# Patient Record
Sex: MALE | Race: WHITE | NOT HISPANIC OR LATINO | Employment: PART TIME | URBAN - METROPOLITAN AREA
[De-identification: names, ages, dates, MRNs, and addresses within clinical notes are randomized per-mention and may not be internally consistent; named-entity substitution may affect disease eponyms.]

---

## 2021-08-26 ENCOUNTER — TREATMENT (OUTPATIENT)
Dept: PHYSICAL THERAPY | Facility: CLINIC | Age: 36
End: 2021-08-26

## 2021-08-26 DIAGNOSIS — Z78.9 DIFFICULTY NAVIGATING STAIRS: ICD-10-CM

## 2021-08-26 DIAGNOSIS — M25.561 ACUTE PAIN OF RIGHT KNEE: Primary | ICD-10-CM

## 2021-08-26 DIAGNOSIS — M54.50 ACUTE LEFT-SIDED LOW BACK PAIN WITHOUT SCIATICA: ICD-10-CM

## 2021-08-26 PROCEDURE — 97161 PT EVAL LOW COMPLEX 20 MIN: CPT | Performed by: PHYSICAL THERAPIST

## 2021-08-26 PROCEDURE — 97112 NEUROMUSCULAR REEDUCATION: CPT | Performed by: PHYSICAL THERAPIST

## 2021-08-26 PROCEDURE — 97110 THERAPEUTIC EXERCISES: CPT | Performed by: PHYSICAL THERAPIST

## 2021-08-26 PROCEDURE — 97530 THERAPEUTIC ACTIVITIES: CPT | Performed by: PHYSICAL THERAPIST
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
## 2021-09-21 ENCOUNTER — TREATMENT (OUTPATIENT)
Dept: PHYSICAL THERAPY | Facility: CLINIC | Age: 36
End: 2021-09-21

## 2021-09-21 DIAGNOSIS — M25.561 ACUTE PAIN OF RIGHT KNEE: Primary | ICD-10-CM

## 2021-09-21 DIAGNOSIS — M54.50 ACUTE LEFT-SIDED LOW BACK PAIN WITHOUT SCIATICA: ICD-10-CM

## 2021-09-21 DIAGNOSIS — Z78.9 DIFFICULTY NAVIGATING STAIRS: ICD-10-CM

## 2021-09-21 PROCEDURE — 97110 THERAPEUTIC EXERCISES: CPT | Performed by: PHYSICAL THERAPIST

## 2021-09-21 PROCEDURE — 97112 NEUROMUSCULAR REEDUCATION: CPT | Performed by: PHYSICAL THERAPIST

## 2021-09-21 NOTE — PROGRESS NOTES
Physical Therapy Daily Progress Note    Patient: Erich Casanova   : 1985  Diagnosis/ICD-10 Code:  Acute pain of right knee [M25.561]  Referring practitioner: Tryo Stone MD  Date of Initial Visit: Type: THERAPY  Noted: 2021  Today's Date: 2021  Patient seen for 2 sessions           Subjective Sore in back and outside of knee    Objective   Trial of kinsio taping to head of fib and distal IT BAND   Fibular mobs    THEREX with EDUCATION for home   1- FROG STRETCH  bottoms of feet together  1-5 min  2- FRONT OF HIP  STRETCH   a. Cross leg gentleman style and push knee down  10-20 sec  3- BACK OF HIP STRETCH    a. Pretzel stretch /  reverse pigeon  4- PIGEON POSE  5- ABS    a. Legs up in chair position with BACK FLAT  i. Straighten 1 leg and keep back flat   switch legs  GOAL1 MIN  6- SIDELYING LEG LIFT   10-15x  1-2 sets  7- 1 LEG BALANCE at mirror  8- NINJA WALK  a. Knees straight  b. Knees bent  9- POWER walk       Assessment/Plan    A: significant work needed in single limb stance with noted improved functional walk once muscles are activated   PLAN; progress strengthening and closed chain activities        Timed:    Manual Therapy:    0     mins  03390;  Therapeutic Exercise:    23     mins  93039;     Neuromuscular Monique:    23    mins  42164;    Therapeutic Activity:     0     mins  55184;     Gait Trainin     mins  54944;     Ultrasound:     0     mins  71158;    Electrical Stimulation:    0     mins  17977 ( );  Iontophoresis    0     mins 54485;  Aquatic Therapy    0     mins 99632;  Dry Needling              0     mins    Untimed:  Electrical Stimulation:    0     mins  06145 ( );  Mechanical Traction:    0     mins  00367;     Timed Treatment:   46   mins   Total Treatment:     46   mins  Tiffany Parkinson, PT  Physical Therapist

## 2021-09-28 ENCOUNTER — TREATMENT (OUTPATIENT)
Dept: PHYSICAL THERAPY | Facility: CLINIC | Age: 36
End: 2021-09-28

## 2021-09-28 DIAGNOSIS — M25.561 ACUTE PAIN OF RIGHT KNEE: Primary | ICD-10-CM

## 2021-09-28 DIAGNOSIS — Z78.9 DIFFICULTY NAVIGATING STAIRS: ICD-10-CM

## 2021-09-28 DIAGNOSIS — M54.50 ACUTE LEFT-SIDED LOW BACK PAIN WITHOUT SCIATICA: ICD-10-CM

## 2021-09-28 PROCEDURE — 97110 THERAPEUTIC EXERCISES: CPT | Performed by: PHYSICAL THERAPIST

## 2021-09-28 PROCEDURE — 97035 APP MDLTY 1+ULTRASOUND EA 15: CPT | Performed by: PHYSICAL THERAPIST

## 2021-09-28 NOTE — PROGRESS NOTES
Physical Therapy Daily Progress Note    Patient: Erich Casanova   : 1985  Diagnosis/ICD-10 Code:  Acute pain of right knee [M25.561]  Referring practitioner: Troy Stone MD  Date of Initial Visit: Type: THERAPY  Noted: 2021  Today's Date: 2021  Patient seen for 3 sessions           Subjective Back feels good  Knee still bothers espeically after walking approximately 1 mile.  Pain is on the outside and towards the front but deep inside.  It feels maybe 50% back to normal      Objective   LCL laxity test painful on RIGHT vs LEFT   No significant tenderness to palptaion escept for distal ITBand and fibular head    Single limb stance limited with lack of terminal knee extension      Noted trunk SIDE FLEXION  secondry to poor weight shift thru hips     US 3 Mhz to distal IT band and lateral knee x 8 @ 1.5 w/cm2    SIDE LYING hip abd x 10  With verbal cueing for proper technique     Elliptical x 2 min with verbal cueing for knee joint protection       May do intervals with machines to minimize irritation to knee   Leg press matrix seat 8  100#  Terminal knee extension x 10     Kavya   Hip add x 40# x 10 bilaterally x 10 single  Hip abd  30#  x 10 bilaterally x 10 single      Assessment/Plan    A: may need to go back to MD for reassess if pain is centralizing deep in lateral joint    PLAN progress therex with care for joint protection     Timed:    Manual Therapy:    0     mins  33218;  Therapeutic Exercise:    30     mins  70806;     Neuromuscular Monique:    0    mins  20898;    Therapeutic Activity:     0     mins  18991;     Gait Trainin     mins  86265;     Ultrasound:     10     mins  49022;    Electrical Stimulation:    0     mins  88328 ( );  Iontophoresis    0     mins 63876;  Aquatic Therapy    0     mins 50738;  Dry Needling              0     mins    Untimed:  Electrical Stimulation:    0     mins  77372 (MC );  Mechanical Traction:    0     mins  70493;     Timed  Treatment:   38   mins   Total Treatment:     38   mins  Tiffany Parkinson, PT  Physical Therapist

## 2021-10-12 ENCOUNTER — TREATMENT (OUTPATIENT)
Dept: PHYSICAL THERAPY | Facility: CLINIC | Age: 36
End: 2021-10-12

## 2021-10-12 DIAGNOSIS — Z78.9 DIFFICULTY NAVIGATING STAIRS: ICD-10-CM

## 2021-10-12 DIAGNOSIS — M25.561 ACUTE PAIN OF RIGHT KNEE: Primary | ICD-10-CM

## 2021-10-12 DIAGNOSIS — M54.50 ACUTE LEFT-SIDED LOW BACK PAIN WITHOUT SCIATICA: ICD-10-CM

## 2021-10-12 PROCEDURE — 97110 THERAPEUTIC EXERCISES: CPT | Performed by: PHYSICAL THERAPIST

## 2021-10-12 PROCEDURE — 97112 NEUROMUSCULAR REEDUCATION: CPT | Performed by: PHYSICAL THERAPIST

## 2021-10-12 NOTE — PROGRESS NOTES
"30-Day / 10-Visit Progress Note         Patient: Erich Casanova   : 1985  Diagnosis/ICD-10 Code:  Acute pain of right knee [M25.561]  Referring practitioner: Troy Stone MD  Date of Initial Visit: Type: THERAPY  Noted: 2021  Today's Date: 10/12/2021  Patient seen for 4 sessions      Subjective:     Clinical Progress: improved  Home Program Compliance: Yes  Treatment has included:  therapeutic exercise, therapeutic activity, neuro-muscular retraining , gait training and patient education with home exercise program     Subjective   Better but still varies day to day   Avoiding running and higher level activites  PAIN on lateral knee 4/10 with certain motn stairs 5/10 brief only while doing stiars   Walking 30 min or so then lateral knee will be sore the rest of the day   Single limb stance will exacerbation bilaterally knees but LEFT only 3-4/10      Objective     Leg press x 10    Wall sit without ball 1 min     With ball 1 min    Both with verbal cueing for proper technique   Hip abd 30# x 10 bilaterally x 10 Single        Add  40# x 10 bilaterally x 10 Single    PRE running drills for neuro warm up and plyometrics   Bound x 1 min   Giant steps x 4 laps   \"fairy leaps\" x 2 laps   Jog style to be fluid with verbal cueing     RIGHT   -2-139  LEFT   -2 147  SLR  60 bilaterally   RACHEL 25% stiff at end rnag ewithout symptom     Assessment/Plan   A; progressing well with therex but lateral knee pain remains  Having MRI on Thursday   Plan Goals: STG: to be met by 6 weeks  1- Patient will report pain < 0 /10 with light household activities      ONGOING   2-Patient will increase Lumbar ROM  to  WNL without compensation or exacerbation        MET   Lumbar AROM WNL all planes  3-Patient will be independent with HEP without compensation or exacerbation       MET   LTG: to be met by 12 weeks  1-Pt will report pain < 0 /10 with recreational activities   2-Pt will increase RACHEL RIGHT   to  WNL   RIGHT= LEFT " without compensation or exacerbation   3-Patient will improve single limb stance to WNL without compensation or exacerbation for static and dynamic activities such as walking and navigating stairs without exacerbation   4-Pt will be independent with symptom management and comprehsive HEP and at gym    PLAN await results of MRI     Recommendations: Continue as planned  Timeframe: 2 months  Prognosis to achieve goals: good    PT Signature: Tiffany Parkinson, PT      Based upon review of the patient's progress and continued therapy plan, it is my medical opinion that Erich Casanova should continue physical therapy treatment at [facility]  [address] [place] [address] [place]   [place] [address]  [phone].    Signature: __________________________________  Troy Stone MD    Timed:  Manual Therapy:    0     mins  51083;  Therapeutic Exercise:    23     mins  30438;     Neuromuscular Monique:    23    mins  82777;    Therapeutic Activity:     0     mins  81007;     Gait Trainin     mins  61770;     Ultrasound:     0     mins  10965;    Electrical Stimulation:    0     mins  97184 ( );  Iontophoresis    0     mins 89143;  Dry Needling              0     mins    Untimed:  Electrical Stimulation:    0     mins  11376 ( );  Mechanical Traction:    0     mins  30059;     Timed Treatment:   46   mins   Total Treatment:     46   mins

## 2021-10-19 ENCOUNTER — TREATMENT (OUTPATIENT)
Dept: PHYSICAL THERAPY | Facility: CLINIC | Age: 36
End: 2021-10-19

## 2021-10-19 DIAGNOSIS — Z78.9 DIFFICULTY NAVIGATING STAIRS: ICD-10-CM

## 2021-10-19 DIAGNOSIS — M25.561 ACUTE PAIN OF RIGHT KNEE: Primary | ICD-10-CM

## 2021-10-19 DIAGNOSIS — M54.50 ACUTE LEFT-SIDED LOW BACK PAIN WITHOUT SCIATICA: ICD-10-CM

## 2021-10-19 PROCEDURE — 97112 NEUROMUSCULAR REEDUCATION: CPT | Performed by: PHYSICAL THERAPIST

## 2021-10-19 PROCEDURE — 97110 THERAPEUTIC EXERCISES: CPT | Performed by: PHYSICAL THERAPIST

## 2021-10-19 NOTE — PROGRESS NOTES
Physical Therapy Daily Progress Note    Patient: Erich Casanova   : 1985  Diagnosis/ICD-10 Code:  Acute pain of right knee [M25.561]  Referring practitioner: Troy Stone MD  Date of Initial Visit: Type: THERAPY  Noted: 2021  Today's Date: 10/19/2021  Patient seen for 5 sessions           Subjective I received the results of my MRI   All was good no surgery needed staring NSAIDS for edema    Objective   EDUCATION for interarticular edema and how can affect ADLs      Use of MH  ICE and increasing cardio to point of sweating    Hamstring stretch 60 sec x 4 with verbal cueing for proper technique   Calf stretch  30 sec for gastroc and soleus x 2    Standing at wall;  Off edge of step; with pro stretch     Single limb stance balance with work with verbal cueing for core stabilization      EDUCATION and recommendation to try shoe inserts to support arch to assist and stability of closed chain activities     Elliptical with verbal cueing for proper technique for joint protection of knee       Assessment/Plan    A: progressing well with ROM and static strengthening  MRI shows edema in the joing which new meds should help  PLAN reassess after new NSAIDS and if better start pre running drills and single limb stance work        Timed:    Manual Therapy:    0     mins  84495;  Therapeutic Exercise:    23     mins  47914;     Neuromuscular Monique:    15    mins  50390;    Therapeutic Activity:     0     mins  58038;     Gait Trainin     mins  16580;     Ultrasound:     0     mins  97185;    Electrical Stimulation:    0     mins  09390 ( );  Iontophoresis    0     mins 83914;  Aquatic Therapy    0     mins 94979;  Dry Needling              0     mins    Untimed:  Electrical Stimulation:    0     mins  79499 ( );  Mechanical Traction:    0     mins  90197;     Timed Treatment:   46   mins   Total Treatment:     46   mins  Tiffany Parkinson, JESSI  Physical Therapist

## 2021-10-26 ENCOUNTER — TREATMENT (OUTPATIENT)
Dept: PHYSICAL THERAPY | Facility: CLINIC | Age: 36
End: 2021-10-26

## 2021-10-26 DIAGNOSIS — M25.561 ACUTE PAIN OF RIGHT KNEE: Primary | ICD-10-CM

## 2021-10-26 DIAGNOSIS — M54.50 ACUTE LEFT-SIDED LOW BACK PAIN WITHOUT SCIATICA: ICD-10-CM

## 2021-10-26 DIAGNOSIS — Z78.9 DIFFICULTY NAVIGATING STAIRS: ICD-10-CM

## 2021-10-26 PROCEDURE — 97112 NEUROMUSCULAR REEDUCATION: CPT | Performed by: PHYSICAL THERAPIST

## 2021-10-26 PROCEDURE — 97110 THERAPEUTIC EXERCISES: CPT | Performed by: PHYSICAL THERAPIST

## 2021-10-26 NOTE — PROGRESS NOTES
"Physical Therapy Daily Progress Note    Patient: Erich Casanova   : 1985  Diagnosis/ICD-10 Code:  Acute pain of right knee [M25.561]  Referring practitioner: Troy Stone MD  Date of Initial Visit: Type: THERAPY  Noted: 2021  Today's Date: 10/26/2021  Patient seen for 6 sessions           Subjective feeling about the same.  Some pain with stiffness affecting ability to feel like I can not get up and run whenever I wanted      Objective   Elliptical with EDUCATION for warm up   Interval training and goal to break a sweat to pump inflammation out of knee   VC for weight on heels and to progress in painfree range    Single limb stance work    On step \"huy\"   At mirror with bands for \"tippy bird\"   All with verbal cueing for proper technique and recruitment of slings    Hamstring stretch with rope  30 sec x 2  Piriformis stretch 30 sec x 2     Ice x 10       Assessment/Plan  :A improved work with closed chain activities   PLAN reassess incerased vigor with intervals          Timed:    Manual Therapy:    0     mins  83640;  Therapeutic Exercise:    15     mins  96845;     Neuromuscular Monique:    30    mins  52715;    Therapeutic Activity:     0     mins  28830;     Gait Trainin     mins  02941;     Ultrasound:     0     mins  27698;    Electrical Stimulation:    0     mins  93770 ( );  Iontophoresis    0     mins 34540;  Aquatic Therapy    0     mins 43017;  Dry Needling              0     mins    Untimed:  Electrical Stimulation:    0     mins  84940 ( );  Mechanical Traction:    0     mins  61035;     Timed Treatment:   45   mins   Total Treatment:     45   mins  Tiffany Parkinson PT  Physical Therapist  "
Astrid Miller (male)

## 2021-11-02 ENCOUNTER — TREATMENT (OUTPATIENT)
Dept: PHYSICAL THERAPY | Facility: CLINIC | Age: 36
End: 2021-11-02

## 2021-11-02 DIAGNOSIS — M54.50 ACUTE LEFT-SIDED LOW BACK PAIN WITHOUT SCIATICA: ICD-10-CM

## 2021-11-02 DIAGNOSIS — M25.561 ACUTE PAIN OF RIGHT KNEE: Primary | ICD-10-CM

## 2021-11-02 DIAGNOSIS — Z78.9 DIFFICULTY NAVIGATING STAIRS: ICD-10-CM

## 2021-11-02 PROCEDURE — 97110 THERAPEUTIC EXERCISES: CPT | Performed by: PHYSICAL THERAPIST

## 2021-11-02 PROCEDURE — 97112 NEUROMUSCULAR REEDUCATION: CPT | Performed by: PHYSICAL THERAPIST

## 2021-11-02 NOTE — PROGRESS NOTES
"Physical Therapy Daily Progress Note    Patient: Erich Casanova   : 1985  Diagnosis/ICD-10 Code:  Acute pain of right knee [M25.561]  Referring practitioner: Troy Stone MD  Date of Initial Visit: Type: THERAPY  Noted: 2021  Today's Date: 2021  Patient seen for 7 sessions           Subjective meloxicam 9on it now for about 2 weeks)  Is starting to kick in and pretty much no pain except after working out    staris are ok     Piriformis stretch and prigeon still causes soreness on the outside of the knee    Was able to jog approximately 1/2 mile previously       Objective   Elliptical x 5 min with ed for proper technique to be joint friendly for knee    Stretch   Piriformis  30 sec x  bilaterally   Hamstring  60 sec c 2 bilaterally     Pre running drills     Skipping   Hamstring kicks  High knees   Cross over front   Cross over back   Verbal cueing for all and ed as to joint protection for knee  Running   Verbal cueing to minimize vertical \"bounce\"     Increased push off thru foot   [place]    Single limb stance mechanics    Verbal cueing for stance leg mechanics     All with slight complaints of soreness laterally       Assessment/Plan  A' progressing well with increased vigor of closed chain activities and pre running drills  PLAN reassess how felt after new exercise and modify as needed          Timed:    Manual Therapy:    0     mins  31740;  Therapeutic Exercise:    23     mins  54579;     Neuromuscular Monique:    23    mins  87143;    Therapeutic Activity:     0     mins  08055;     Gait Trainin     mins  10127;     Ultrasound:     0     mins  52167;    Electrical Stimulation:    0     mins  31299 ( );  Iontophoresis    0     mins 50363;  Aquatic Therapy    0     mins 50682;  Dry Needling              0     mins    Untimed:  Electrical Stimulation:    0     mins  13409 (MC );  Mechanical Traction:    0     mins  41452;     Timed Treatment:   46   mins   Total Treatment:  "    46   mins  Tiffany Parkinson, PT  Physical Therapist

## 2023-03-16 ENCOUNTER — DOCUMENTATION (OUTPATIENT)
Dept: PHYSICAL THERAPY | Facility: CLINIC | Age: 38
End: 2023-03-16
Payer: COMMERCIAL